# Patient Record
Sex: MALE | Race: WHITE | HISPANIC OR LATINO
[De-identification: names, ages, dates, MRNs, and addresses within clinical notes are randomized per-mention and may not be internally consistent; named-entity substitution may affect disease eponyms.]

---

## 2023-01-03 PROBLEM — Z00.00 ENCOUNTER FOR PREVENTIVE HEALTH EXAMINATION: Status: ACTIVE | Noted: 2023-01-03

## 2023-01-04 ENCOUNTER — APPOINTMENT (OUTPATIENT)
Dept: HOME HEALTH SERVICES | Facility: HOME HEALTH | Age: 78
End: 2023-01-04
Payer: MEDICARE

## 2023-01-04 VITALS
RESPIRATION RATE: 18 BRPM | DIASTOLIC BLOOD PRESSURE: 70 MMHG | TEMPERATURE: 97 F | SYSTOLIC BLOOD PRESSURE: 180 MMHG | OXYGEN SATURATION: 97 % | HEART RATE: 41 BPM

## 2023-01-04 DIAGNOSIS — Z71.89 OTHER SPECIFIED COUNSELING: ICD-10-CM

## 2023-01-04 DIAGNOSIS — Z23 ENCOUNTER FOR IMMUNIZATION: ICD-10-CM

## 2023-01-04 PROCEDURE — 99497 ADVNCD CARE PLAN 30 MIN: CPT

## 2023-01-04 PROCEDURE — G0506: CPT

## 2023-01-04 PROCEDURE — 99344 HOME/RES VST NEW MOD MDM 60: CPT | Mod: 25

## 2023-01-04 RX ORDER — GLIPIZIDE 2.5 MG/1
2.5 TABLET, EXTENDED RELEASE ORAL DAILY
Qty: 90 | Refills: 3 | Status: ACTIVE | COMMUNITY
Start: 2023-01-04

## 2023-01-04 RX ORDER — APIXABAN 2.5 MG/1
2.5 TABLET, FILM COATED ORAL
Qty: 60 | Refills: 3 | Status: ACTIVE | COMMUNITY
Start: 2023-01-04

## 2023-01-04 RX ORDER — REPAGLINIDE 1 MG/1
1 TABLET ORAL 3 TIMES DAILY
Qty: 90 | Refills: 3 | Status: ACTIVE | COMMUNITY
Start: 2023-01-04

## 2023-01-04 RX ORDER — ATORVASTATIN CALCIUM 40 MG/1
40 TABLET, FILM COATED ORAL
Qty: 90 | Refills: 3 | Status: ACTIVE | COMMUNITY
Start: 2023-01-04

## 2023-01-04 RX ORDER — PANTOPRAZOLE 40 MG/1
40 TABLET, DELAYED RELEASE ORAL DAILY
Qty: 1 | Refills: 3 | Status: ACTIVE | COMMUNITY
Start: 2023-01-04

## 2023-01-04 RX ORDER — LISINOPRIL 40 MG/1
40 TABLET ORAL
Qty: 90 | Refills: 3 | Status: ACTIVE | COMMUNITY
Start: 2023-01-04

## 2023-01-04 RX ORDER — FUROSEMIDE 40 MG/1
40 TABLET ORAL TWICE DAILY
Qty: 90 | Refills: 3 | Status: ACTIVE | COMMUNITY
Start: 2023-01-04

## 2023-01-04 RX ORDER — AMLODIPINE BESYLATE 5 MG/1
5 TABLET ORAL DAILY
Qty: 90 | Refills: 3 | Status: ACTIVE | COMMUNITY
Start: 2023-01-04

## 2023-01-04 NOTE — HISTORY OF PRESENT ILLNESS
[Patient] : patient [Family Member] : family member [Formal Caregiver] : formal caregiver [FreeTextEntry1] :  atrial fibrillation, hypertension, lower extremity edema, diabetes type II, GERDS and ICD [FreeTextEntry2] : 76 yo male with atrial fibrillation, hypertension, lower extremity edema, diabetes type II, GERDS and ICD.  being seen for initial evaluation.  No recent hospitalizations\par \par Patient/ patient's caregiver reports no weight loss >10lbs in the past 6 months. No changes in dentition or swallowing reported, No changes in hearing or vision reported. No changes in Cognition reported. Patient denies any symptoms of depression or anxiety. Patient is ADL i and IADL independent. No changes in gait or falls reported. \par Patient's home environment is safe.\par \par Patient denies fever, cough, trouble breathing, rash, vomiting and diarrhea. Patient has not been in close contact with someone covid positive.\par \par N95 mask, gloves, eye wear and gown used during visit: [Y]. Total face to face time with patient is 60 min.\par \par \par

## 2023-01-04 NOTE — COUNSELING
[Normal Weight - ( BMI  <25 )] : normal weight - ( BMI  <25 ) [Hypertension self management education material provided] : hypertension self management education material provided [Non - Smoker] : non-smoker [Use assistive device to avoid falls] : use assistive device to avoid falls [___] : [unfilled] [] : foot exam [Patient not on disease-modifying anti-rheumatic drug due to overall prognosis] : Patient not on disease-modifying anti-rheumatic drug due to overall prognosis [Not Recommended] : Aspirin use not recommended due to overall prognosis [ - New patient with 2 or more chronic conditions; CCM discussed and patient-centered care plan established] : New patient with 2 or more chronic conditions; CCM discussed and patient-centered care plan established [FreeTextEntry4] : Educated patient/legal representative about CCM services and consent.\par Educated patient/legal representative that this service is available because they have 2 or more chronic conditions, that only one Provider can furnish CCM Services per calendar month and that CCM services are subject to the usual Medicare deductible and coinsurance. \par Patient/legal representative understands the right to stop the CCM services at any time by notifying [facility] office.\par Patient/legal representative has verbally consented 1/2023\par \par

## 2023-01-04 NOTE — CHRONIC CARE ASSESSMENT
[Inadequate social support] : inadequate social support [PPS Score: ____] : Palliative Performance Scale (PPS) Score: [unfilled] [FAST Score: ____] : Functional Assessment Scale (FAST) Score: [unfilled] [de-identified] : as tolerated [de-identified] : low sodium

## 2023-01-04 NOTE — REASON FOR VISIT
[Initial Evaluation] : an initial evaluation [Family Member] : family member [Formal Caregiver] : formal caregiver [Pre-Visit Preparation] : pre-visit preparation was not done [Intercurrent Specialty/Sub-specialty Visits] : the patient has no intercurrent specialty/sub-specialty visits [FreeTextEntry1] :  atrial fibrillation, hypertension, lower extremity edema, diabetes type II, GERDS and ICD

## 2023-03-05 NOTE — PHYSICAL EXAM
[No Acute Distress] : no acute distress [Well Nourished] : well nourished [Normal Sclera/Conjunctiva] : normal sclera/conjunctiva [Well Developed] : well developed [EOMI] : extra ocular movement intact [Normal Outer Ear/Nose] : the ears and nose were normal in appearance [Normal Oropharynx] : the oropharynx was normal [No Respiratory Distress] : no respiratory distress [Clear to Auscultation] : lungs were clear to auscultation bilaterally [No Accessory Muscle Use] : no accessory muscle use [Normal Rate] : heart rate was normal  [Regular Rhythm] : with a regular rhythm [Normal S1, S2] : normal S1 and S2 [No Murmurs] : no murmurs heard [No Edema] : there was no peripheral edema [Normal Bowel Sounds] : normal bowel sounds [Non Tender] : non-tender [Soft] : abdomen soft [Not Distended] : not distended [Normal Post Cervical Nodes] : no posterior cervical lymphadenopathy [Normal Anterior Cervical Nodes] : no anterior cervical lymphadenopathy [No CVA Tenderness] : no ~M costovertebral angle tenderness [No Spinal Tenderness] : no spinal tenderness [Normal Gait] : normal gait [Normal Strength/Tone] : muscle strength and tone were normal [No Rash] : no rash [Oriented x3] : oriented to person, place, and time [Normal Affect] : the affect was normal

## 2023-03-06 ENCOUNTER — APPOINTMENT (OUTPATIENT)
Dept: HOME HEALTH SERVICES | Facility: HOME HEALTH | Age: 78
End: 2023-03-06
Payer: MEDICARE

## 2023-03-06 VITALS
SYSTOLIC BLOOD PRESSURE: 144 MMHG | TEMPERATURE: 97 F | OXYGEN SATURATION: 96 % | RESPIRATION RATE: 18 BRPM | DIASTOLIC BLOOD PRESSURE: 70 MMHG | HEART RATE: 53 BPM

## 2023-03-06 PROCEDURE — 99349 HOME/RES VST EST MOD MDM 40: CPT

## 2023-03-06 NOTE — COUNSELING
[Normal Weight - ( BMI  <25 )] : normal weight - ( BMI  <25 ) [Hypertension self management education material provided] : hypertension self management education material provided [Non - Smoker] : non-smoker [Use assistive device to avoid falls] : use assistive device to avoid falls [___] : [unfilled] [] : eye exam [Patient not on disease-modifying anti-rheumatic drug due to overall prognosis] : Patient not on disease-modifying anti-rheumatic drug due to overall prognosis [Not Recommended] : Aspirin use not recommended due to overall prognosis [FreeTextEntry4] : Educated patient/legal representative about CCM services and consent.\par Educated patient/legal representative that this service is available because they have 2 or more chronic conditions, that only one Provider can furnish CCM Services per calendar month and that CCM services are subject to the usual Medicare deductible and coinsurance. \par Patient/legal representative understands the right to stop the CCM services at any time by notifying [facility] office.\par Patient/legal representative has verbally consented 1/2023\par \par

## 2023-03-06 NOTE — HISTORY OF PRESENT ILLNESS
[Education provided] : education provided [Patient] : patient [Family Member] : family member [Formal Caregiver] : formal caregiver [A] : A [LastPCPVisitDate] : 4/11/23 [FreeTextEntry1] :  atrial fibrillation, hypertension, lower extremity edema, diabetes type II, GERDS and ICD [FreeTextEntry2] : 76 yo male with atrial fibrillation, hypertension, lower extremity edema, diabetes type II, GERDS and ICD.  being seen for initial evaluation.  No recent hospitalizations.  Pressure elevated has not taken his medication yet.  Will take after he eat breakfast\par \par Patient/ patient's caregiver reports no weight loss >10lbs in the past 6 months. No changes in dentition or swallowing reported, No changes in hearing or vision reported. No changes in Cognition reported. Patient denies any symptoms of depression or anxiety. Patient is ADL i and IADL independent. No changes in gait or falls reported. \par Patient's home environment is safe.\par \par Patient denies fever, cough, trouble breathing, rash, vomiting and diarrhea. Patient has not been in close contact with someone covid positive.\par \par N95 mask, gloves, eye wear and gown used during visit: [Y]. Total face to face time with patient is 60 min.\par \par \par

## 2023-03-06 NOTE — REASON FOR VISIT
[Follow-Up] : a follow-up visit [Family Member] : family member [Formal Caregiver] : formal caregiver [Pre-Visit Preparation] : pre-visit preparation was not done [Intercurrent Specialty/Sub-specialty Visits] : the patient has no intercurrent specialty/sub-specialty visits [FreeTextEntry1] :  atrial fibrillation, hypertension, lower extremity edema, diabetes type II, GERDS and ICD

## 2023-03-06 NOTE — CHRONIC CARE ASSESSMENT
[PPS Score: ____] : Palliative Performance Scale (PPS) Score: [unfilled] [FAST Score: ____] : Functional Assessment Scale (FAST) Score: [unfilled] [Inadequate social support] : inadequate social support [de-identified] : as tolerated [de-identified] : low sodium

## 2023-05-17 ENCOUNTER — APPOINTMENT (OUTPATIENT)
Dept: HOME HEALTH SERVICES | Facility: HOME HEALTH | Age: 78
End: 2023-05-17
Payer: MEDICARE

## 2023-05-17 VITALS
DIASTOLIC BLOOD PRESSURE: 70 MMHG | SYSTOLIC BLOOD PRESSURE: 138 MMHG | HEART RATE: 60 BPM | TEMPERATURE: 97 F | RESPIRATION RATE: 18 BRPM | OXYGEN SATURATION: 96 %

## 2023-05-17 PROCEDURE — 99348 HOME/RES VST EST LOW MDM 30: CPT

## 2023-05-17 NOTE — CHRONIC CARE ASSESSMENT
[PPS Score: ____] : Palliative Performance Scale (PPS) Score: [unfilled] [FAST Score: ____] : Functional Assessment Scale (FAST) Score: [unfilled] [Inadequate social support] : inadequate social support [de-identified] : as tolerated [de-identified] : low sodium

## 2023-05-17 NOTE — HISTORY OF PRESENT ILLNESS
[A] : A [Education provided] : education provided [Patient] : patient [Family Member] : family member [Formal Caregiver] : formal caregiver [LastPCPVisitDate] : 4/11/23 [FreeTextEntry1] : [facility] [phone] [FreeTextEntry2] : 76 yo male with atrial fibrillation, hypertension, lower extremity edema, diabetes type II, GERDS and ICD.  being seen for initial evaluation.  No recent hospitalizations.  Pressure elevated has not taken his medication yet.  Will take after he eat breakfast\par \par Patient/ patient's caregiver reports no weight loss >10lbs in the past 6 months. No changes in dentition or swallowing reported, No changes in hearing or vision reported. No changes in Cognition reported. Patient denies any symptoms of depression or anxiety. Patient is ADL i and IADL independent. No changes in gait or falls reported. \par Patient's home environment is safe.\par \par Patient denies fever, cough, trouble breathing, rash, vomiting and diarrhea. Patient has not been in close contact with someone covid positive.\par \par N95 mask, gloves, eye wear and gown used during visit: [Y]. Total face to face time with patient is 60 min.\par \par \par

## 2023-05-17 NOTE — COUNSELING
[Normal Weight - ( BMI  <25 )] : normal weight - ( BMI  <25 ) [Hypertension self management education material provided] : hypertension self management education material provided [Non - Smoker] : non-smoker [Use assistive device to avoid falls] : use assistive device to avoid falls [___] : [unfilled] [] : foot exam [Patient not on disease-modifying anti-rheumatic drug due to overall prognosis] : Patient not on disease-modifying anti-rheumatic drug due to overall prognosis [Not Recommended] : Aspirin use not recommended due to overall prognosis [FreeTextEntry4] : Educated patient/legal representative about CCM services and consent.\par Educated patient/legal representative that this service is available because they have 2 or more chronic conditions, that only one Provider can furnish CCM Services per calendar month and that CCM services are subject to the usual Medicare deductible and coinsurance. \par Patient/legal representative understands the right to stop the CCM services at any time by notifying [facility] office.\par Patient/legal representative has verbally consented 1/2023\par \par

## 2023-06-26 ENCOUNTER — APPOINTMENT (OUTPATIENT)
Dept: HOME HEALTH SERVICES | Facility: HOME HEALTH | Age: 78
End: 2023-06-26

## 2023-09-01 ENCOUNTER — APPOINTMENT (OUTPATIENT)
Dept: HOME HEALTH SERVICES | Facility: HOME HEALTH | Age: 78
End: 2023-09-01

## 2023-09-01 DIAGNOSIS — J02.9 ACUTE PHARYNGITIS, UNSPECIFIED: ICD-10-CM

## 2023-09-01 RX ORDER — DEXAMETHASONE 4 MG/1
4 TABLET ORAL ONCE
Qty: 1 | Refills: 0 | Status: ACTIVE | COMMUNITY
Start: 2023-09-01 | End: 1900-01-01

## 2023-09-01 RX ORDER — DEXAMETHASONE 6 MG/1
6 TABLET ORAL ONCE
Qty: 1 | Refills: 0 | Status: ACTIVE | COMMUNITY
Start: 2023-09-01 | End: 1900-01-01

## 2023-09-03 ENCOUNTER — APPOINTMENT (OUTPATIENT)
Dept: HOME HEALTH SERVICES | Facility: HOME HEALTH | Age: 78
End: 2023-09-03

## 2023-09-03 ENCOUNTER — NON-APPOINTMENT (OUTPATIENT)
Age: 78
End: 2023-09-03

## 2023-09-03 RX ORDER — LANCETS 33 GAUGE
EACH MISCELLANEOUS
Qty: 100 | Refills: 0 | Status: ACTIVE | COMMUNITY
Start: 2022-11-29

## 2023-09-03 RX ORDER — BLOOD SUGAR DIAGNOSTIC
STRIP MISCELLANEOUS
Qty: 50 | Refills: 0 | Status: ACTIVE | COMMUNITY
Start: 2023-03-04

## 2023-09-03 RX ORDER — FOLIC ACID 1 MG/1
1 TABLET ORAL
Qty: 90 | Refills: 0 | Status: ACTIVE | COMMUNITY
Start: 2023-06-14

## 2023-09-03 RX ORDER — APIXABAN 5 MG/1
5 TABLET, FILM COATED ORAL
Qty: 180 | Refills: 0 | Status: ACTIVE | COMMUNITY
Start: 2023-08-02

## 2023-09-03 RX ORDER — BUDESONIDE AND FORMOTEROL FUMARATE DIHYDRATE 80; 4.5 UG/1; UG/1
80-4.5 AEROSOL RESPIRATORY (INHALATION)
Qty: 31 | Refills: 0 | Status: ACTIVE | COMMUNITY
Start: 2023-06-19

## 2023-09-03 RX ORDER — TRIAMCINOLONE ACETONIDE 1 MG/G
0.1 OINTMENT TOPICAL
Qty: 454 | Refills: 0 | Status: ACTIVE | COMMUNITY
Start: 2023-03-04

## 2023-09-03 RX ORDER — MULTIVIT-MIN/FOLIC/VIT K/LYCOP 400-300MCG
25 MCG TABLET ORAL
Qty: 90 | Refills: 0 | Status: ACTIVE | COMMUNITY
Start: 2022-12-29

## 2023-09-03 RX ORDER — FLUOCINONIDE 0.5 MG/ML
0.05 SOLUTION TOPICAL
Qty: 60 | Refills: 0 | Status: ACTIVE | COMMUNITY
Start: 2023-08-10

## 2023-09-03 RX ORDER — ALBUTEROL SULFATE 90 UG/1
108 (90 BASE) INHALANT RESPIRATORY (INHALATION)
Qty: 8 | Refills: 0 | Status: ACTIVE | COMMUNITY
Start: 2023-06-30

## 2023-09-03 RX ORDER — EMPAGLIFLOZIN 10 MG/1
10 TABLET, FILM COATED ORAL
Qty: 90 | Refills: 0 | Status: ACTIVE | COMMUNITY
Start: 2023-08-04

## 2023-09-03 RX ORDER — DULAGLUTIDE 4.5 MG/.5ML
4.5 INJECTION, SOLUTION SUBCUTANEOUS
Qty: 2 | Refills: 0 | Status: ACTIVE | COMMUNITY
Start: 2023-04-25

## 2023-09-03 RX ORDER — HYDROCORTISONE 1 %
12 CREAM (GRAM) TOPICAL
Qty: 225 | Refills: 0 | Status: ACTIVE | COMMUNITY
Start: 2023-06-19

## 2023-09-03 RX ORDER — CLOTRIMAZOLE 10 MG/G
1 CREAM TOPICAL
Qty: 30 | Refills: 0 | Status: COMPLETED | COMMUNITY
Start: 2023-06-19

## 2023-09-03 RX ORDER — KETOCONAZOLE 20.5 MG/ML
2 SHAMPOO, SUSPENSION TOPICAL
Qty: 120 | Refills: 0 | Status: COMPLETED | COMMUNITY
Start: 2023-03-04

## 2023-09-03 RX ORDER — AMLODIPINE BESYLATE 10 MG/1
10 TABLET ORAL
Qty: 90 | Refills: 0 | Status: ACTIVE | COMMUNITY
Start: 2023-04-11

## 2023-09-05 PROBLEM — M25.561 KNEE PAIN, BILATERAL: Status: ACTIVE | Noted: 2023-01-04

## 2023-09-05 PROBLEM — Z95.810 ICD (IMPLANTABLE CARDIOVERTER-DEFIBRILLATOR) IN PLACE: Status: ACTIVE | Noted: 2023-01-04

## 2023-09-05 PROBLEM — R04.0 EPISTAXIS: Status: ACTIVE | Noted: 2023-09-03

## 2023-09-05 PROBLEM — E11.9 CONTROLLED TYPE 2 DIABETES MELLITUS: Status: ACTIVE | Noted: 2023-01-04

## 2023-09-05 PROBLEM — I48.91 ATRIAL FIBRILLATION, UNSPECIFIED TYPE: Status: ACTIVE | Noted: 2023-01-04

## 2023-09-05 PROBLEM — K21.9 GERD (GASTROESOPHAGEAL REFLUX DISEASE): Status: ACTIVE | Noted: 2023-01-04

## 2023-09-05 PROBLEM — R60.0 BILATERAL LEG EDEMA: Status: ACTIVE | Noted: 2023-01-04

## 2023-09-05 PROBLEM — I10 BENIGN HYPERTENSION: Status: ACTIVE | Noted: 2023-01-04

## 2023-09-06 ENCOUNTER — APPOINTMENT (OUTPATIENT)
Dept: HOME HEALTH SERVICES | Facility: HOME HEALTH | Age: 78
End: 2023-09-06
Payer: MEDICARE

## 2023-09-06 VITALS
RESPIRATION RATE: 18 BRPM | TEMPERATURE: 97 F | HEART RATE: 77 BPM | SYSTOLIC BLOOD PRESSURE: 140 MMHG | DIASTOLIC BLOOD PRESSURE: 80 MMHG | OXYGEN SATURATION: 97 %

## 2023-09-06 DIAGNOSIS — Z95.810 PRESENCE OF AUTOMATIC (IMPLANTABLE) CARDIAC DEFIBRILLATOR: ICD-10-CM

## 2023-09-06 DIAGNOSIS — M25.562 PAIN IN RIGHT KNEE: ICD-10-CM

## 2023-09-06 DIAGNOSIS — I10 ESSENTIAL (PRIMARY) HYPERTENSION: ICD-10-CM

## 2023-09-06 DIAGNOSIS — R04.0 EPISTAXIS: ICD-10-CM

## 2023-09-06 DIAGNOSIS — K21.9 GASTRO-ESOPHAGEAL REFLUX DISEASE W/OUT ESOPHAGITIS: ICD-10-CM

## 2023-09-06 DIAGNOSIS — R60.0 LOCALIZED EDEMA: ICD-10-CM

## 2023-09-06 DIAGNOSIS — I48.91 UNSPECIFIED ATRIAL FIBRILLATION: ICD-10-CM

## 2023-09-06 DIAGNOSIS — E11.9 TYPE 2 DIABETES MELLITUS W/OUT COMPLICATIONS: ICD-10-CM

## 2023-09-06 DIAGNOSIS — M25.561 PAIN IN RIGHT KNEE: ICD-10-CM

## 2023-09-06 PROCEDURE — 99342 HOME/RES VST NEW LOW MDM 30: CPT

## 2023-09-06 NOTE — HISTORY OF PRESENT ILLNESS
[A] : A [Education provided] : education provided [Patient] : patient [Family Member] : family member [Formal Caregiver] : formal caregiver [[facility] Co-Management Patient] : [unfilled] is a [facility] [employer] co-management patient [LastPCPVisitDate] : 4/11/23 [FreeTextEntry1] :  atrial fibrillation, hypertension, lower extremity edema, diabetes type II, GERDS and ICD [FreeTextEntry2] : 78 yo male with atrial fibrillation, hypertension, lower extremity edema, diabetes type II, GERDS and ICD.  being seen for initial evaluation.  No recent hospitalizations.  Pressure elevated has not taken his medication yet.  Will take after he eat breakfast.  Recent hospitalization for nose bleed/elevated BP given decongestant nasal spray at discharge.    Patient/ patient's caregiver reports no weight loss >10lbs in the past 6 months. No changes in dentition or swallowing reported, No changes in hearing or vision reported. No changes in Cognition reported. Patient denies any symptoms of depression or anxiety. Patient is ADL i and IADL independent. No changes in gait or falls reported.  Patient's home environment is safe.  Patient denies fever, cough, trouble breathing, rash, vomiting and diarrhea. Patient has not been in close contact with someone covid positive.  N95 mask, gloves, eye wear and gown used during visit: [Y]. Total face to face time with patient is 60 min.

## 2023-09-06 NOTE — CHRONIC CARE ASSESSMENT
[PPS Score: ____] : Palliative Performance Scale (PPS) Score: [unfilled] [FAST Score: ____] : Functional Assessment Scale (FAST) Score: [unfilled] [Inadequate social support] : inadequate social support [de-identified] : as tolerated [de-identified] : low sodium

## 2023-09-15 ENCOUNTER — APPOINTMENT (OUTPATIENT)
Dept: HOME HEALTH SERVICES | Facility: HOME HEALTH | Age: 78
End: 2023-09-15

## 2023-12-13 ENCOUNTER — NON-APPOINTMENT (OUTPATIENT)
Age: 78
End: 2023-12-13

## 2023-12-15 ENCOUNTER — NON-APPOINTMENT (OUTPATIENT)
Age: 78
End: 2023-12-15

## 2023-12-19 ENCOUNTER — NON-APPOINTMENT (OUTPATIENT)
Age: 78
End: 2023-12-19